# Patient Record
Sex: FEMALE | Race: WHITE | Employment: OTHER | URBAN - METROPOLITAN AREA
[De-identification: names, ages, dates, MRNs, and addresses within clinical notes are randomized per-mention and may not be internally consistent; named-entity substitution may affect disease eponyms.]

---

## 2024-09-18 ENCOUNTER — HOSPITAL ENCOUNTER (EMERGENCY)
Facility: HOSPITAL | Age: 69
Discharge: HOME | End: 2024-09-18
Attending: STUDENT IN AN ORGANIZED HEALTH CARE EDUCATION/TRAINING PROGRAM
Payer: MEDICARE

## 2024-09-18 ENCOUNTER — APPOINTMENT (OUTPATIENT)
Dept: GENERAL RADIOLOGY | Facility: HOSPITAL | Age: 69
End: 2024-09-18
Attending: STUDENT IN AN ORGANIZED HEALTH CARE EDUCATION/TRAINING PROGRAM
Payer: MEDICARE

## 2024-09-18 ENCOUNTER — APPOINTMENT (OUTPATIENT)
Dept: GENERAL RADIOLOGY | Facility: HOSPITAL | Age: 69
End: 2024-09-18
Payer: MEDICARE

## 2024-09-18 VITALS
BODY MASS INDEX: 35.88 KG/M2 | HEIGHT: 62 IN | RESPIRATION RATE: 16 BRPM | DIASTOLIC BLOOD PRESSURE: 78 MMHG | WEIGHT: 195 LBS | SYSTOLIC BLOOD PRESSURE: 156 MMHG | TEMPERATURE: 99 F | OXYGEN SATURATION: 94 % | HEART RATE: 70 BPM

## 2024-09-18 DIAGNOSIS — S52.135A CLOSED NONDISPLACED FRACTURE OF NECK OF LEFT RADIUS, INITIAL ENCOUNTER: Primary | ICD-10-CM

## 2024-09-18 PROCEDURE — 73080 X-RAY EXAM OF ELBOW: CPT | Performed by: STUDENT IN AN ORGANIZED HEALTH CARE EDUCATION/TRAINING PROGRAM

## 2024-09-18 PROCEDURE — 99284 EMERGENCY DEPT VISIT MOD MDM: CPT

## 2024-09-18 PROCEDURE — 73560 X-RAY EXAM OF KNEE 1 OR 2: CPT | Performed by: STUDENT IN AN ORGANIZED HEALTH CARE EDUCATION/TRAINING PROGRAM

## 2024-09-18 PROCEDURE — 73090 X-RAY EXAM OF FOREARM: CPT | Performed by: STUDENT IN AN ORGANIZED HEALTH CARE EDUCATION/TRAINING PROGRAM

## 2024-09-18 NOTE — ED INITIAL ASSESSMENT (HPI)
Pt ambulatory via EMS through triage c/o mechanical fall over cub in parking lot while walking. -LOC, -blood thinners. Did not hit head. L forearm pain and R knee pain.

## 2024-09-18 NOTE — DISCHARGE INSTRUCTIONS
Take Tylenol or ibuprofen as needed for pain  Call the orthopedist for follow-up  Return to the ER for worsening pain numbness weakness or tingling or any new concerns

## 2024-09-19 NOTE — ED PROVIDER NOTES
Patient Seen in: [facility] Emergency Department      History     Chief Complaint   Patient presents with    Trauma     Stated Complaint:     Subjective:   HPI    69-year-old female with history of hypertension hyperlipidemia diabetes GERD and asthma presenting for evaluation of a fall.  She states she tripped over a curb while in a parking lot walking into a restaurant.  She fell onto her right knee and left elbow.  No head injury or LOC.  No neck or back pain.  Does not use blood thinners.  No numbness weakness or tingling.  No preceding chest pain or shortness of breath.    Objective:   Past Medical History:    Asthma (HCC)    Cancer (HCC)    Diabetes (HCC)    Esophageal reflux    Essential hypertension    Hyperlipidemia              Past Surgical History:   Procedure Laterality Date    Lumpectomy right Right                 Social History     Socioeconomic History    Marital status: Single   Tobacco Use    Smoking status: Every Day     Types: Cigarettes    Smokeless tobacco: Never   Substance and Sexual Activity    Alcohol use: Never    Drug use: Never              Review of Systems    Positive for stated Chief Complaint: Trauma    Other systems are as noted in HPI.  Constitutional and vital signs reviewed.      All other systems reviewed and negative except as noted above.    Physical Exam     ED Triage Vitals [09/18/24 1144]   /75   Pulse 67   Resp 16   Temp 98.8 °F (37.1 °C)   Temp src Oral   SpO2 93 %   O2 Device None (Room air)       Current Vitals:   Vital Signs  BP: 156/78  Pulse: 70  Resp: 16  Temp: 98.8 °F (37.1 °C)  Temp src: Oral    Oxygen Therapy  SpO2: 94 %  O2 Device: None (Room air)            Physical Exam    Constitutional: awake, alert, no sig distress  HENT: mmm, no lesions,  Neck: normal range of motion, no tenderness, supple.  Eyes: PERRL, EOMI, conjunctiva normal, no discharge. Sclera anicteric.  Cardiovascular: rr no murmur  Respiratory: Normal breath sounds, no respiratory  distress, no wheezing, no chest tenderness.  GI: Bowel sounds normal, Soft, no tenderness, no masses, no pulsatile masses.  : No CVA tenderness.  Skin: Warm, dry, no erythema, no rash.  Left upper extremity exam: Tenderness ecchymosis and swelling about left elbow and left proximal forearm.  Resists supination and pronation.  Radial pulse 2+ no wrist drop intrinsic muscles of the hand intact.  -Mild tenderness about anterior right knee  Neurologic: Alert & oriented x 3, normal motor function, normal sensory function, no focal deficits noted.  Psych: Calm, cooperative, nl affect    ED Course   Labs Reviewed - No data to display                   MDM      69-year-old female present with ground-level fall left elbow and right knee injury.  On arrival vitals are stable and reassuring  She is ambulatory  Fall seems mechanical and nonsyncopal in nature  DDx includes contusion radial head fracture radial neck fracture distal humeral fracture, patellar fracture    Independent viewed patient's plain radiographs which are suggestive of left radial neck fracture.  Sent for formal elbow x-ray series.  X-ray of right knee shows no acute fracture or dislocation.  Will place in long-arm splint and discharged with orthopedic follow-up.  Return precautions and follow-up instructions were discussed with patient who voiced understanding and agreement the plan.  All questions were answered to patient satisfaction.                             Medical Decision Making      Disposition and Plan     Clinical Impression:  1. Closed nondisplaced fracture of neck of left radius, initial encounter         Disposition:  Discharge  9/18/2024  2:53 pm    Follow-up:  [facility]  [address]  [facility] [id number] [phone]  Follow up  As needed, If symptoms worsen    [address] [id number]  Call today      We recommend that you schedule follow up  care with a primary care provider within the next three months to obtain basic health screening including reassessment of your blood pressure.      Medications Prescribed:  There are no discharge medications for this patient.